# Patient Record
Sex: MALE | Race: WHITE | NOT HISPANIC OR LATINO | Employment: FULL TIME | URBAN - METROPOLITAN AREA
[De-identification: names, ages, dates, MRNs, and addresses within clinical notes are randomized per-mention and may not be internally consistent; named-entity substitution may affect disease eponyms.]

---

## 2024-01-02 ENCOUNTER — OFFICE VISIT (OUTPATIENT)
Dept: FAMILY MEDICINE | Facility: CLINIC | Age: 54
End: 2024-01-02
Payer: COMMERCIAL

## 2024-01-02 VITALS
RESPIRATION RATE: 20 BRPM | SYSTOLIC BLOOD PRESSURE: 118 MMHG | HEART RATE: 84 BPM | BODY MASS INDEX: 19.75 KG/M2 | DIASTOLIC BLOOD PRESSURE: 60 MMHG | WEIGHT: 149 LBS | OXYGEN SATURATION: 94 % | TEMPERATURE: 98.6 F | HEIGHT: 73 IN

## 2024-01-02 DIAGNOSIS — K21.00 GASTROESOPHAGEAL REFLUX DISEASE WITH ESOPHAGITIS, UNSPECIFIED WHETHER HEMORRHAGE: ICD-10-CM

## 2024-01-02 DIAGNOSIS — F41.1 GENERALIZED ANXIETY DISORDER: ICD-10-CM

## 2024-01-02 DIAGNOSIS — R06.02 SHORTNESS OF BREATH: ICD-10-CM

## 2024-01-02 DIAGNOSIS — F17.200 NICOTINE DEPENDENCE, UNCOMPLICATED, UNSPECIFIED NICOTINE PRODUCT TYPE: ICD-10-CM

## 2024-01-02 DIAGNOSIS — F17.210 CIGARETTE SMOKER: ICD-10-CM

## 2024-01-02 DIAGNOSIS — Z12.11 SCREEN FOR COLON CANCER: ICD-10-CM

## 2024-01-02 DIAGNOSIS — Z00.00 ROUTINE GENERAL MEDICAL EXAMINATION AT A HEALTH CARE FACILITY: Primary | ICD-10-CM

## 2024-01-02 DIAGNOSIS — R07.89 OTHER CHEST PAIN: ICD-10-CM

## 2024-01-02 DIAGNOSIS — J45.20 MILD INTERMITTENT ASTHMA WITHOUT COMPLICATION: ICD-10-CM

## 2024-01-02 PROCEDURE — 99214 OFFICE O/P EST MOD 30 MIN: CPT | Mod: 25 | Performed by: STUDENT IN AN ORGANIZED HEALTH CARE EDUCATION/TRAINING PROGRAM

## 2024-01-02 PROCEDURE — 99386 PREV VISIT NEW AGE 40-64: CPT | Performed by: STUDENT IN AN ORGANIZED HEALTH CARE EDUCATION/TRAINING PROGRAM

## 2024-01-02 PROCEDURE — 99406 BEHAV CHNG SMOKING 3-10 MIN: CPT | Performed by: STUDENT IN AN ORGANIZED HEALTH CARE EDUCATION/TRAINING PROGRAM

## 2024-01-02 RX ORDER — OMEPRAZOLE 40 MG/1
40 CAPSULE, DELAYED RELEASE ORAL DAILY
Qty: 90 CAPSULE | Refills: 3 | Status: SHIPPED | OUTPATIENT
Start: 2024-01-02

## 2024-01-02 RX ORDER — ALBUTEROL SULFATE 90 UG/1
2 AEROSOL, METERED RESPIRATORY (INHALATION) EVERY 6 HOURS PRN
Qty: 18 G | Refills: 0 | Status: SHIPPED | OUTPATIENT
Start: 2024-01-02

## 2024-01-02 RX ORDER — FLUTICASONE FUROATE AND VILANTEROL 100; 25 UG/1; UG/1
1 POWDER RESPIRATORY (INHALATION) DAILY
Qty: 60 EACH | Refills: 4 | Status: SHIPPED | OUTPATIENT
Start: 2024-01-02

## 2024-01-02 RX ORDER — HYDROXYZINE PAMOATE 25 MG/1
25 CAPSULE ORAL 3 TIMES DAILY PRN
Qty: 270 CAPSULE | Refills: 1 | Status: SHIPPED | OUTPATIENT
Start: 2024-01-02 | End: 2024-09-10

## 2024-01-02 ASSESSMENT — ENCOUNTER SYMPTOMS
CONSTIPATION: 0
HEMATURIA: 0
SHORTNESS OF BREATH: 1
FEVER: 0
DIARRHEA: 0
NAUSEA: 0
PALPITATIONS: 0
SORE THROAT: 0
JOINT SWELLING: 0
DIZZINESS: 0
EYE PAIN: 0
CHILLS: 0
ARTHRALGIAS: 0
WEAKNESS: 0
COUGH: 0
HEMATOCHEZIA: 0
DYSURIA: 0
ABDOMINAL PAIN: 0
MYALGIAS: 0
HEARTBURN: 1
PARESTHESIAS: 0
NERVOUS/ANXIOUS: 1
HEADACHES: 0
FREQUENCY: 0

## 2024-01-02 NOTE — PROGRESS NOTES
SUBJECTIVE:   Jose is a 53 year old, presenting for the following:  Physical        1/2/2024     7:18 AM   Additional Questions   Roomed by Eveline COOK         1/2/2024     7:18 AM   Patient Reported Additional Medications   Patient reports taking the following new medications None       Healthy Habits:     Getting at least 3 servings of Calcium per day:  Yes    Bi-annual eye exam:  NO    Dental care twice a year:  NO    Sleep apnea or symptoms of sleep apnea:  None    Diet:  Regular (no restrictions)    Frequency of exercise:  None    Taking medications regularly:  Yes    Medication side effects:  None    Additional concerns today:  Yes    GERD: Previously managed with over-the-counter proton pump inhibitor (PPI).  Anxiety: Formerly treated with Xanax and a selective serotonin reuptake inhibitor. The patient expresses a desire to resume Xanax, noting its past effectiveness in enhancing his quality of life. However, he has been unable to access medication due to a lack of insurance.  The patient reports intermittent shortness of breath and chest pain, being a smoker with a history of using inhalers.  Has a history of asthma.  Currently, he is not interested in undergoing blood work today.    Today's PHQ-2 Score:       1/2/2024     7:04 AM   PHQ-2 ( 1999 [employer])   Q1: Little interest or pleasure in doing things 0   Q2: Feeling down, depressed or hopeless 1   PHQ-2 Score 1   Q1: Little interest or pleasure in doing things Not at all   Q2: Feeling down, depressed or hopeless Several days   PHQ-2 Score 1               Have you ever done Advance Care Planning? (For example, a Health Directive, POLST, or a discussion with a medical provider or your loved ones about your wishes): No, advance care planning information given to patient to review.  Patient declined advance care planning discussion at this time.    Social History     Tobacco Use    Smoking status: Every Day     Types: Cigarettes    Smokeless tobacco:  "Never   Substance Use Topics    Alcohol use: Not on file             1/2/2024     7:04 AM   Alcohol Use   Prescreen: >3 drinks/day or >7 drinks/week? Not Applicable       Last PSA: No results found for: \"PSA\"    Reviewed orders with patient. Reviewed health maintenance and updated orders accordingly - Yes  Labs reviewed in EPIC    Reviewed and updated as needed this visit by clinical staff    Allergies  Meds              Reviewed and updated as needed this visit by Provider                 History reviewed. No pertinent past medical history.   History reviewed. No pertinent surgical history.    Review of Systems   Constitutional:  Negative for chills and fever.   HENT:  Negative for congestion, ear pain, hearing loss and sore throat.    Eyes:  Negative for pain and visual disturbance.   Respiratory:  Positive for shortness of breath. Negative for cough.    Cardiovascular:  Positive for chest pain. Negative for palpitations and peripheral edema.   Gastrointestinal:  Positive for heartburn. Negative for abdominal pain, constipation, diarrhea, hematochezia and nausea.   Genitourinary:  Negative for dysuria, frequency, genital sores, hematuria and urgency.   Musculoskeletal:  Negative for arthralgias, joint swelling and myalgias.   Skin:  Negative for rash.   Neurological:  Negative for dizziness, weakness, headaches and paresthesias.   Psychiatric/Behavioral:  Negative for mood changes. The patient is nervous/anxious.          OBJECTIVE:   /60 (BP Location: Right arm, Patient Position: Chair, Cuff Size: Adult Regular)   Pulse 84   Temp 98.6  F (37  C) (Oral)   Resp 20   Ht 1.842 m (6' 0.5\")   Wt 67.6 kg (149 lb)   SpO2 94%   BMI 19.93 kg/m      Physical Exam  GENERAL: healthy, alert and no distress  EYES: Eyes grossly normal to inspection, PERRL and conjunctivae and sclerae normal  HENT: ear canals and TM's normal, nose and mouth without ulcers or lesions  RESP:  no rales, rhonchi . Wheezes present.  CV: " regular rate and rhythm, normal S1 S2, no S3 or S4, no murmur, click or rub, no peripheral edema and peripheral pulses strong  ABDOMEN: soft, nontender, no hepatosplenomegaly, no masses and bowel sounds normal  MS: no gross musculoskeletal defects noted, no edema  SKIN: no suspicious lesions or rashes  NEURO: Normal strength and tone, mentation intact and speech normal  PSYCH: mentation appears normal, affect normal/bright        ASSESSMENT/PLAN:   1. Routine general medical examination at a health care facility  He is not interested in blood work  or vaccines today.    2. Shortness of breath  stable  - Echocardiogram Exercise Stress; Future  - General PFT Lab (Please always keep checked); Future  - Pulmonary Function Test; Futur  3. Gastroesophageal reflux disease with esophagitis, unspecified whether hemorrhage  uncontrolled  - start omeprazole (PRILOSEC) 40 MG DR capsule; Take 1 capsule (40 mg) by mouth daily  Dispense: 90 capsule; Refill: 3    4. Mild intermittent asthma without complication    - albuterol (PROAIR HFA/PROVENTIL HFA/VENTOLIN HFA) 108 (90 Base) MCG/ACT inhaler; Inhale 2 puffs into the lungs every 6 hours as needed for shortness of breath, wheezing or cough  Dispense: 18 g; Refill: 0  - fluticasone-vilanterol (BREO ELLIPTA) 100-25 MCG/ACT inhaler; Inhale 1 puff into the lungs daily  Dispense: 60 each; Refill: 4  - General PFT Lab (Please always keep checked); Future  - Pulmonary Function Test; Future    5. Generalized anxiety disorder    I informed the patient about the potential risks associated with prolonged daily use of benzodiazepines for anxiety treatment. I suggested considering a selective serotonin reuptake inhibitor (SSRI) or serotonin-norepinephrine reuptake inhibitor (SNRI) instead. However, the patient declined, citing a history of intolerance to three different SSRI medications.  Upon reviewing the medical records, it was noted that the patient had experienced complications from  benzodiazepine withdrawal and had a history of alcoholic intoxication. Notably, in 2018, the patient was able to tolerate Paxil according to the office notes. During that period, the patient was also concurrently using hydroxyzine and Xanax in addition to Paxil.  - Adult Mental Health  Referral; Future  - hydrOXYzine aileen (VISTARIL) 25 MG capsule; Take 1 capsule (25 mg) by mouth 3 times daily as needed for anxiety  Dispense: 270 capsule; Refill: 1    6. Other chest pain  No chest pain at this time  - Echocardiogram Exercise Stress; Future    7. Screen for colon cancer    - Adult GI  Referral - Procedure Only; Future    8. Cigarette smoker    Spoke with patient for 3 minutes regarding tobaccos use. Discussed risk of continued smoking including: hypertension, dyslipidemia, cardiovascular and peripheral vascular disease, COPD, lung cancer. Discussed barriers and options for smoking cessation.   Tobacco Cessation Action Plan       Patient has been advised of split billing requirements and indicates understanding: Yes      COUNSELING:   Reviewed preventive health counseling, as reflected in patient instructions        He reports that he has been smoking cigarettes. He has never used smokeless tobacco.            Diana Titus MD  [facility]

## 2024-01-04 NOTE — COMMUNITY RESOURCES LIST (ENGLISH)
01/04/2024   [facility]  N/A  For questions about this resource list or additional care needs, please contact your primary care clinic or care manager.  Phone: [phone]   Email: N/A   Address: [facility] [place] [address]   Hours: N/A        Transportation       Free or low-cost transportation  1  [facility] Distance: 10.78 miles      In-Person   [address] [place] [address]  Language: English  Hours: Mon - Wed 9:30 AM - 12:00 PM , Mon - Wed 1:00 PM - 2:00 PM Appt. Only  Fees: Free   Phone: (860) 153-6221 Email: info@[employer].org Website: http://www.saintolaf.org/     2  [employer] - Free or low-cost transportation Distance: 10.91 miles      In-Person   [address] [place] [address]  Language: English  Hours: Mon - Fri 9:00 AM - 5:00 PM  Fees: Free   Phone: (260) 230-7773 Email: info@[employer] Website: https://www.[employer]/     Transportation to medical appointments  3  [facility]  [facility] Distance: 5.05 miles      In-Person   [address] [place] [address]  Language: Latvian, Turkish, English, Gujarati, Carlos, Vietnamese, Malay, Danish, Kazakh, Hebrew  Hours: Mon - Wed 9:00 AM - 5:00 PM , Thu 12:00 PM - 6:00 PM , Fri 9:00 AM - 5:00 PM , Sun 10:30 AM - 2:00 PM Appt. Only  Fees: Free   Phone: (183) 192-6227 Email: info@sewa-aifw.org Website: https://www.sewa-aifw.org/     4  [employer] Distance: 9.42 miles      In-Person   [address] [place] [address]  Language: English  Hours: Mon - Sat 4:00 AM - 6:00 PM  Fees: Insurance, Self Pay   Phone: (147) 959-1562 Email: delighttransportation1@[employer].com Website: https://[employer].[facility]./HelpMeConnect/Providers/Delight_Transportation/Transportation/[email or web address]?returnUrl=%2FHelpMeConnect%2FSearch%2FBasicNeeds%2FTransportation%2FTransportationServices%3Fstart%3D40          Important Numbers & Websites        Emergency Services   911  [facility]   [facility]  Poison Control   (210) 414-2669  Suicide Prevention Lifeline   (831) 352-9083 (TALK)  Child Abuse Hotline   (588) 265-9949 (4-A-Child)  Sexual Assault Hotline   (317) 629-7078 (HOPE)  National Runaway Safeline   (833) 466-9723 (RUNAWAY)  All-Options Talkline   (876) 815-4277  Substance Abuse Referral   (810) 862-9821 (HELP)

## 2024-01-15 ENCOUNTER — TELEPHONE (OUTPATIENT)
Dept: FAMILY MEDICINE | Facility: CLINIC | Age: 54
End: 2024-01-15
Payer: COMMERCIAL

## 2024-01-15 DIAGNOSIS — F17.210 CIGARETTE SMOKER: ICD-10-CM

## 2024-01-15 DIAGNOSIS — J45.20 MILD INTERMITTENT ASTHMA WITHOUT COMPLICATION: Primary | ICD-10-CM

## 2024-01-15 NOTE — TELEPHONE ENCOUNTER
Prior Authorization Retail Medication Request    Medication/Dose: fluticasone-vilanterol (BREO ELLIPTA) 100-25 MCG/ACT inhaler  Diagnosis and ICD code (if different than what is on RX):  J45.20   New/renewal/insurance change PA/secondary ins. PA:  Previously Tried and Failed:  na  Rationale:  na    Insurance   Primary: [employer]  Insurance ID:  [id number]     Secondary (if applicable):na  Insurance ID:  na    Pharmacy Information (if different than what is on RX)  Name:   [facility]  Phone:  [phone]  Fax:    [phone]

## 2024-01-18 ENCOUNTER — TELEPHONE (OUTPATIENT)
Dept: FAMILY MEDICINE | Facility: CLINIC | Age: 54
End: 2024-01-18

## 2024-01-18 DIAGNOSIS — K25.0 ACUTE GASTRIC ULCER WITH HEMORRHAGE: Primary | ICD-10-CM

## 2024-01-18 NOTE — LETTER
January 19, 2024      Jose Jacobsen  [address]   [place] [address]        To Whom It May Concern:      Please excuse him  until 1/22/24 due to illness.        Sincerely,        Diana Titus MD

## 2024-01-18 NOTE — TELEPHONE ENCOUNTER
Pt was discharged from [facility] last night 1/17/24    He had an upper GI bleed and they had to put 3 staples in to close.  He was given 3 pints of blood as well.    He has a follow up with DR Keenan on 1/22 for hospital follow up.  He will be following with [facility] as well    He was not given a work note and is needing something to give to his work place.    This was the only this in his discharge instructions    It is important to slowly return to your regular level of activity. Start with 5-10 minutes at one time and slowly build to 30 minutes at one time. Save your energy by spreading out activities that make you tired. Rest as needed.       Would you be able to provide a work note for him to be out of work till Monday when he sees you?      He will need this note soon.  He can have someone  at the  today       PER Cam    Triage Nurse  [facility]

## 2024-01-19 RX ORDER — ACETAMINOPHEN 500 MG
500-1000 TABLET ORAL EVERY 6 HOURS PRN
Qty: 60 TABLET | Refills: 1 | Status: SHIPPED | OUTPATIENT
Start: 2024-01-19

## 2024-01-19 NOTE — TELEPHONE ENCOUNTER
He is okay to take tylenol (500-1000 mg every 6 hours as needed for pain, no more than 3000 mg a day.. He should not take NSAIDS.

## 2024-01-19 NOTE — TELEPHONE ENCOUNTER
Notified patient of the orders, message below per PCP.  Patient stated that his mom picked up the note.        Patient is asking for a script for Pepcid AC to take BID, which he was advised to take when he came home for hospital, and tylenol.  He does not have a lot of money, and his hoping that insurance may cover it.    He is also wondering if he should be taking the omeprazole.    Evelyn GARCIA RN  Triage Nurse  [facility]

## 2024-01-19 NOTE — TELEPHONE ENCOUNTER
Spoke with patient, relayed providers message below and patient verbalized understanding.     Provider:   Pt is asking if provider has alternative OTC medications that he can take for pain. Pt is not taking any pain medications right now as he is worried that he will get an upset stomach.     Team:  Pt also requesting team to print absence from work Letter for someone to  at the .     Ernestine Ryan RN on 1/19/2024 at 1:34 PM

## 2024-01-19 NOTE — TELEPHONE ENCOUNTER
Routing to PCP    Patient calling again regarding the work note.    He also is wondering if he can get a short supply of oxycodone or some sort of pain med as the hospital was giving him prn dilaudid and oxycodone and did not send him home with any pain med.    He has hosp follow up on 1/22 regarding this GI bleed. RN advised that if he is in significant pain, he needs to go to ED and he denied that he was in significant pain, more so that he was getting pain meds in the hospital and he is frustrated they did not send him home with any when he does have some pain.    Patient was adamant on sending this request to PCP    Are you willing to send short supply of pain med and provide a work note? Or should he wait until appt on Monday?    Tequila Weiss RN

## 2024-01-19 NOTE — TELEPHONE ENCOUNTER
I did not see recommendation to take pepcid in chart from when he was in the hospital. They recommended oral PPI for the next 3 months. He need to take the the Omeprazole to help with the healing of his ulcer. I have sent tylenol to the pharmacy    Per hospital note:EGD done on 1/16 showed nonbleeding ulcer with adherent clot which was clipped. GI recommended IV PPI for 3 days then switch to oral PPI for next 3 months. Recommended repeat endoscopy in 2 months to check healing.

## 2024-01-19 NOTE — TELEPHONE ENCOUNTER
Spoke with patient, relayed providers message below. Pt states that he was given 262 mg bismuth subsalicylate tablet to take 2 tabs by mouth 4 times daily from hospital.    Pt states that he was given a verbal from the hospital provider for this. Pt states that he has appointment 1/22/24 with provider and plan to discuss this at that time as well to get the appropriate medications needed for care plan.     Ernestine Ryan RN on 1/19/2024 at 5:27 PM

## 2024-01-22 ENCOUNTER — OFFICE VISIT (OUTPATIENT)
Dept: FAMILY MEDICINE | Facility: CLINIC | Age: 54
End: 2024-01-22
Payer: COMMERCIAL

## 2024-01-22 VITALS
DIASTOLIC BLOOD PRESSURE: 76 MMHG | WEIGHT: 141 LBS | HEIGHT: 73 IN | OXYGEN SATURATION: 98 % | SYSTOLIC BLOOD PRESSURE: 98 MMHG | HEART RATE: 104 BPM | TEMPERATURE: 97.9 F | BODY MASS INDEX: 18.69 KG/M2

## 2024-01-22 DIAGNOSIS — K92.2 ACUTE UPPER GI BLEED: Primary | ICD-10-CM

## 2024-01-22 DIAGNOSIS — F17.210 CIGARETTE SMOKER: ICD-10-CM

## 2024-01-22 DIAGNOSIS — R57.1 HYPOVOLEMIC SHOCK (H): ICD-10-CM

## 2024-01-22 DIAGNOSIS — K27.9 PEPTIC ULCER DISEASE: ICD-10-CM

## 2024-01-22 DIAGNOSIS — A04.8 H. PYLORI INFECTION: ICD-10-CM

## 2024-01-22 LAB — HGB BLD-MCNC: 13.3 G/DL (ref 13.3–17.7)

## 2024-01-22 PROCEDURE — 85018 HEMOGLOBIN: CPT | Performed by: STUDENT IN AN ORGANIZED HEALTH CARE EDUCATION/TRAINING PROGRAM

## 2024-01-22 PROCEDURE — 99214 OFFICE O/P EST MOD 30 MIN: CPT | Performed by: STUDENT IN AN ORGANIZED HEALTH CARE EDUCATION/TRAINING PROGRAM

## 2024-01-22 PROCEDURE — 36415 COLL VENOUS BLD VENIPUNCTURE: CPT | Performed by: STUDENT IN AN ORGANIZED HEALTH CARE EDUCATION/TRAINING PROGRAM

## 2024-01-22 RX ORDER — METRONIDAZOLE 500 MG/1
500 TABLET ORAL
COMMUNITY
Start: 2024-01-17 | End: 2024-01-31

## 2024-01-22 RX ORDER — FOLIC ACID 1 MG/1
1 TABLET ORAL
COMMUNITY
Start: 2024-01-18

## 2024-01-22 RX ORDER — ALPRAZOLAM 1 MG
1 TABLET ORAL
COMMUNITY

## 2024-01-22 RX ORDER — DOXYCYCLINE 100 MG/1
100 CAPSULE ORAL
COMMUNITY
Start: 2024-01-17 | End: 2024-01-31

## 2024-01-22 RX ORDER — MULTIVITAMIN
1 TABLET ORAL DAILY
COMMUNITY
Start: 2024-01-17

## 2024-01-22 RX ORDER — BISMUTH SUBSALICYLATE 262 MG
2 TABLET,CHEWABLE ORAL
COMMUNITY
Start: 2024-01-17

## 2024-01-22 NOTE — PATIENT INSTRUCTIONS
Gregorio Garcia,    Thank you for allowing [facility] to manage your care.    I ordered some blood work, please go to the laboratory to get your laboratory studies.        I sent your prescriptions to your pharmacy.        I made a referral, they will be calling in approximately 1 week to set up your appointment.  If you do not hear from them, please call the specialty number on your after visit.     For your convenience, test results are released as soon as they are available  Please allow 1-2 business days for me to send you a comment about your results.  If not done so, I encourage you to login into [employer] ([email or web address] to review your results in real time.     If you have any questions or concerns, please feel free to call us at [phone].    Sincerely,    Dr. Titus    Did you know?      You can schedule a video visit for follow-up appointments as well as future appointments for certain conditions.  Please see the below link.     [email or web address]    If you have not already done so,  I encourage you to sign up for [employer] ([email or web address]).  This will allow you to review your results, securely communicate with a provider, and schedule virtual visits as well.

## 2024-01-22 NOTE — LETTER
January 23, 2024      Jose Jacobsen  [address]   [place] [address]        Dear LillieAnn-Marie,    We are writing to inform you of your test results.    Your result is normal. Your hemoglobin has improved.     Resulted Orders   Hemoglobin   Result Value Ref Range    Hemoglobin 13.3 13.3 - 17.7 g/dL       If you have any questions or concerns, please call the clinic at the number listed above.       Sincerely,      Dinaa Titus MD, MPH

## 2024-01-22 NOTE — LETTER
January 22, 2024      Jose Jacobsen  [address]   [facility] [address]        To Whom It May Concern:    Jose Jacobsen  was seen on 1/22/23.  Please excuse him  until 1/25/23 due to illness.He can return to work after 1/26/23 with the following restriction;  No lifting anything more than 10 lb, sitting down as needed every 30 minutes for 6 weeks.        Sincerely,        Diana Titus MD

## 2024-01-22 NOTE — PROGRESS NOTES
Assessment & Plan   53 y.o. male with a past medical history significant for GERD, Asthma, generalized anxiety , history of alcoholism who was admitted to the hospital for hypovolemic shock secondary to GI bleed.Patient underwent EGD on 1/15 with clip placement . He was discharged home on triple therapy for H. pylori with recommendation for repeat EGD in 2 months.  At this time, patient is requesting for opoid for pain control. I recommend other options for pain management (tylenol 500 -1000 mg every 6 hrs, not to exceed 3g per day)due to his history of benzo and opiod dependence.  Warning signs reviewed with patients.Patient is advised to call or go to the ED if he experiences any of the warning signs.    Acute upper GI bleed  S/p blood transfusion, EGD with clip placement. Continue PPI and Pepto-Bismol  - Adult GI  Referral - Consult Only; Future  Work letter given to patient.   Peptic ulcer disease  Recommend continuing alcohol cessation and NSAIDs use.  - Adult GI  Referral - Consult Only; Future    Hypovolemic shock (H)  stable  - Adult GI  Referral - Consult Only; Future  - Hemoglobin; Future  - Hemoglobin    H. pylori infection  Continue Flagyl, PPI and doxycycline. Will need to repeat test in 8 weeks.  Primary care follow up scheduling.    Smoker  Smoking cessation counseled.     MED REC REQUIRED{Post Medication Reconciliation Status:  Discharge medications reconciled, continue medications without change  Discharge medications reconciled and changed, see notes/orders  Nicotine/Tobacco Cessation  He reports that he has been smoking cigarettes. He has never used smokeless tobacco.  Nicotine/Tobacco Cessation Plan  Self help information given to patient          Pawan Garcia is a 53 year old, presenting for the following health issues:  Hospital F/U      1/22/2024     1:40 PM   Additional Questions   Roomed by Eveline COOK         1/22/2024     1:40 PM   Patient Reported  "Additional Medications   Patient reports taking the following new medications See List     HPI         Hospital Follow-up Visit:    Hospital/Nursing Home/IP Rehab Facility:  [facility]  Date of Admission: 1/14/2024  Date of Discharge: 1/17/2024  Reason(s) for Admission: Hemorrhagic shock (HC) (Primary Dx);   Upper GI bleed;   Lactic acidosis;   Gastric ulcer, unspecified chronicity, unspecified whether gastric ulcer hemorrhage or perforation present     Was your hospitalization related to COVID-19? No   Problems taking medications regularly:  None  Medication changes since discharge: See List  Problems adhering to non-medication therapy:  None    Summary of hospitalization:  CareEverywhere information obtained and reviewed  Diagnostic Tests/Treatments reviewed.  Follow up needed: none  Other Healthcare Providers Involved in Patient s Care:         None  Update since discharge: stable.    Plan of care communicated with patient                 Review of Systems  Constitutional, HEENT, cardiovascular, pulmonary, gi and gu systems are negative, except as otherwise noted.      Objective    BP 98/76 (BP Location: Left arm, Patient Position: Chair, Cuff Size: Adult Regular)   Pulse 104   Temp 97.9  F (36.6  C) (Oral)   Ht 1.842 m (6' 0.5\")   Wt 64 kg (141 lb)   SpO2 98%   BMI 18.86 kg/m    Body mass index is 18.86 kg/m .  Physical Exam   GENERAL: alert and no distress  RESP: lungs clear to auscultation - no rales, rhonchi or wheezes  CV: regular rate and rhythm, normal S1 S2, no S3 or S4, no murmur, click or rub, no peripheral edema  ABDOMEN: epigastric is mildly tender to palpation  soft, no hepatosplenomegaly, no masses and bowel sounds normal  MS: no gross musculoskeletal defects noted, no edema        Signed Electronically by: Diana Titus MD    "

## 2024-01-22 NOTE — LETTER
My Asthma Action Plan    Name: Jose Jacobsen   YOB: 1970  Date: 1/22/2024   My doctor: Diana Titus MD   My clinic: [facility]        My Rescue Medicine:   Albuterol inhaler (Proair/Ventolin/Proventil HFA)  2-4 puffs EVERY 4 HOURS as needed. Use a spacer if recommended by your provider.   My Asthma Severity:   Mild Persistent  Know your asthma triggers: smoke and humidity             GREEN ZONE   Good Control  I feel good  No cough or wheeze  Can work, sleep and play without asthma symptoms       Take your asthma control medicine every day.     If exercise triggers your asthma, take your rescue medication  15 minutes before exercise or sports, and  During exercise if you have asthma symptoms  Spacer to use with inhaler: If you have a spacer, make sure to use it with your inhaler             YELLOW ZONE Getting Worse  I have ANY of these:  I do not feel good  Cough or wheeze  Chest feels tight  Wake up at night   Keep taking your Green Zone medications  Start taking your rescue medicine:  every 20 minutes for up to 1 hour. Then every 4 hours for 24-48 hours.  If you stay in the Yellow Zone for more than 12-24 hours, contact your doctor.  If you do not return to the Green Zone in 12-24 hours or you get worse, start taking your oral steroid medicine if prescribed by your provider.           RED ZONE Medical Alert - Get Help  I have ANY of these:  I feel awful  Medicine is not helping  Breathing getting harder  Trouble walking or talking  Nose opens wide to breathe       Take your rescue medicine NOW  If your provider has prescribed an oral steroid medicine, start taking it NOW  Call your doctor NOW  If you are still in the Red Zone after 20 minutes and you have not reached your doctor:  Take your rescue medicine again and  Call 911 or go to the emergency room right away    See your regular doctor within 2 weeks of an Emergency Room or Urgent Care visit for  follow-up treatment.          Annual Reminders:  Meet with Asthma Educator,  Flu Shot in the Fall, consider Pneumonia Vaccination for patients with asthma (aged 19 and older).    Pharmacy: [id number] - [place]    Electronically signed by Diana Titus MD   Date: 01/22/24                    Asthma Triggers  How To Control Things That Make Your Asthma Worse    Triggers are things that make your asthma worse.  Look at the list below to help you find your triggers and   what you can do about them. You can help prevent asthma flare-ups by staying away from your triggers.      Trigger                                                          What you can do   Cigarette Smoke  Tobacco smoke can make asthma worse. Do not allow smoking in your home, car or around you.  Be sure no one smokes at a child s day care or school.  If you smoke, ask your health care provider for ways to help you quit.  Ask family members to quit too.  Ask your health care provider for a referral to Quit Plan to help you quit smoking, or call 5-374-476-PLAN.     Colds, Flu, Bronchitis  These are common triggers of asthma. Wash your hands often.  Don t touch your eyes, nose or mouth.  Get a flu shot every year.     Dust Mites  These are tiny bugs that live in cloth or carpet. They are too small to see. Wash sheets and blankets in hot water every week.   Encase pillows and mattress in dust mite proof covers.  Avoid having carpet if you can. If you have carpet, vacuum weekly.   Use a dust mask and HEPA vacuum.   Pollen and Outdoor Mold  Some people are allergic to trees, grass, or weed pollen, or molds. Try to keep your windows closed.  Limit time out doors when pollen count is high.   Ask you health care provider about taking medicine during allergy season.     Animal Dander  Some people are allergic to skin flakes, urine or saliva from pets with fur or feathers. Keep  pets with fur or feathers out of your home.    If you can t keep the pet outdoors, then keep the pet out of your bedroom.  Keep the bedroom door closed.  Keep pets off cloth furniture and away from stuffed toys.     Mice, Rats, and Cockroaches  Some people are allergic to the waste from these pests.   Cover food and garbage.  Clean up spills and food crumbs.  Store grease in the refrigerator.   Keep food out of the bedroom.   Indoor Mold  This can be a trigger if your home has high moisture. Fix leaking faucets, pipes, or other sources of water.   Clean moldy surfaces.  Dehumidify basement if it is damp and smelly.   Smoke, Strong Odors, and Sprays  These can reduce air quality. Stay away from strong odors and sprays, such as perfume, powder, hair spray, paints, smoke incense, paint, cleaning products, candles and new carpet.   Exercise or Sports  Some people with asthma have this trigger. Be active!  Ask your doctor about taking medicine before sports or exercise to prevent symptoms.    Warm up for 5-10 minutes before and after sports or exercise.     Other Triggers of Asthma  Cold air:  Cover your nose and mouth with a scarf.  Sometimes laughing or crying can be a trigger.  Some medicines and food can trigger asthma.

## 2024-01-23 NOTE — TELEPHONE ENCOUNTER
Central Prior Authorization Team   Phone: [phone]    PA Initiation    Medication: fluticasone-vilanterol (BREO ELLIPTA) 100-25 MCG/ACT inhaler  Insurance Company: [employer] ([facility]) - Phone [phone] Fax [phone]  Pharmacy Filling the Rx: [id number] - [place] - [address] [place] [address] AT [address]  Filling Pharmacy Phone: [phone]  Filling Pharmacy Fax:    Start Date: 1/23/2024

## 2024-01-23 NOTE — TELEPHONE ENCOUNTER
PRIOR AUTHORIZATION DENIED    Medication: fluticasone-vilanterol (BREO ELLIPTA) 100-25 MCG/ACT inhaler    Denial Date: 1/23/2024    Denial Rational:  Patient must have a history of trial & failure to the formulary alternative(s) or have a contraindication or intolerance to the formulary alternatives:                Appeal Information:    If you would like to appeal, please supply P/A team with a letter of medical necessity with clinical reason.

## 2024-01-24 RX ORDER — FLUTICASONE PROPIONATE AND SALMETEROL 100; 50 UG/1; UG/1
1 POWDER RESPIRATORY (INHALATION) 2 TIMES DAILY
Qty: 60 EACH | Refills: 4 | Status: SHIPPED | OUTPATIENT
Start: 2024-01-24

## 2024-02-22 ENCOUNTER — TELEPHONE (OUTPATIENT)
Dept: FAMILY MEDICINE | Facility: CLINIC | Age: 54
End: 2024-02-22
Payer: COMMERCIAL

## 2024-02-22 NOTE — TELEPHONE ENCOUNTER
Forms/Letter Request    Type of form/letter: Work    Have you been seen for this request: Yes Hospital Visit    Do we have the form/letter: Yes: Patient was given work restrictions for 6 weeks but feels he has not been able to heal properly. He is requesting to take 5 days off work and would like a note for employer. Please call [phone] if unable to reach patient through mobile number. He states his phone has been acting up.    When is form/letter needed by: Today 2/22/2024    How would you like the form/letter returned:     Patient Notified form requests are processed in 3-5 business days:Yes    Okay to leave a detailed message?: Yes at Cell number on file:    Telephone Information:   Mobile [phone]

## 2024-02-22 NOTE — LETTER
February 23, 2024      Jose Jacobsen  [address]   [place] [address]        To Whom It May Concern:    Jose LAST Ann-Marie  was seen on 1/22/23.  Please excuse him  until 3/02/23 due to illness.      Sincerely,        Diana Titus MD

## 2024-02-23 NOTE — TELEPHONE ENCOUNTER
Patient wanted provider to know the reason he needs five days off is because he can not follow the restrictions due to his job as a . Please advise when letter has been completed.

## 2024-02-26 NOTE — TELEPHONE ENCOUNTER
Spoke with patient, informed letter completed. Patient will  letter at . Letter placed at  for patient to .

## 2024-09-10 DIAGNOSIS — F41.1 GENERALIZED ANXIETY DISORDER: ICD-10-CM

## 2024-09-12 RX ORDER — HYDROXYZINE PAMOATE 25 MG/1
25 CAPSULE ORAL 3 TIMES DAILY PRN
Qty: 270 CAPSULE | Refills: 0 | Status: SHIPPED | OUTPATIENT
Start: 2024-09-12

## 2024-12-03 ENCOUNTER — PATIENT OUTREACH (OUTPATIENT)
Dept: CARE COORDINATION | Facility: CLINIC | Age: 54
End: 2024-12-03
Payer: COMMERCIAL

## 2024-12-17 ENCOUNTER — PATIENT OUTREACH (OUTPATIENT)
Dept: CARE COORDINATION | Facility: CLINIC | Age: 54
End: 2024-12-17
Payer: COMMERCIAL

## 2024-12-31 ENCOUNTER — TELEPHONE (OUTPATIENT)
Dept: FAMILY MEDICINE | Facility: CLINIC | Age: 54
End: 2024-12-31
Payer: COMMERCIAL

## 2024-12-31 NOTE — TELEPHONE ENCOUNTER
Patient Quality Outreach    Patient is due for the following:   Colon Cancer Screening  Physical Preventive Adult Physical    Action(s) Taken:   Schedule a Adult Preventative    Type of outreach:    Sent letter.    Questions for provider review:    None           Eveline Herrera, CMA

## 2024-12-31 NOTE — LETTER
December 31, 2024    To  Jose Jacobsen  [address]   [facility] [place] [facility] [address]    Your team at [facility] cares about your health. We have reviewed your chart and based on our findings; we are making the following recommendations to better manage your health.     You are in particular need of attention regarding the following:     PREVENTATIVE VISIT: Physical    If you have already completed these items, please contact the clinic via phone or   MyChart so your care team can review and update your records. Thank you for   choosing [facility] for your healthcare needs. For any questions,   concerns, or to schedule an appointment please contact our clinic.    Healthy Regards,      Your [facility] Care Team

## 2024-12-31 NOTE — LETTER
January 29, 2025      To  Jose Jacobsen  [address]   [place] [place] [facility] [address]    Your team at [facility] cares about your health. We have reviewed your chart and based on our findings; we are making the following recommendations to better manage your health.     You are in particular need of attention regarding the following:     PREVENTATIVE VISIT: Physical    If you have already completed these items, please contact the clinic via phone or   MyChart so your care team can review and update your records. Thank you for   choosing [facility] for your healthcare needs. For any questions,   concerns, or to schedule an appointment please contact our clinic.    Healthy Regards,      Your [facility] Care Team

## 2025-01-08 DIAGNOSIS — F41.1 GENERALIZED ANXIETY DISORDER: ICD-10-CM

## 2025-01-08 RX ORDER — HYDROXYZINE PAMOATE 25 MG/1
25 CAPSULE ORAL 3 TIMES DAILY PRN
Qty: 270 CAPSULE | Refills: 0 | Status: SHIPPED | OUTPATIENT
Start: 2025-01-08

## 2025-01-08 NOTE — TELEPHONE ENCOUNTER
Requested Prescriptions   Pending Prescriptions Disp Refills    hydrOXYzine aileen (VISTARIL) 25 MG capsule 270 capsule 0     Sig: Take 1 capsule (25 mg) by mouth 3 times daily as needed for anxiety.       There is no refill protocol information for this order        Tacho Figueroa   [facility] Team

## 2025-04-02 DIAGNOSIS — J45.20 MILD INTERMITTENT ASTHMA WITHOUT COMPLICATION: ICD-10-CM

## 2025-04-02 RX ORDER — ALBUTEROL SULFATE 90 UG/1
INHALANT RESPIRATORY (INHALATION)
Qty: 18 G | Refills: 0 | OUTPATIENT
Start: 2025-04-02